# Patient Record
Sex: MALE | URBAN - METROPOLITAN AREA
[De-identification: names, ages, dates, MRNs, and addresses within clinical notes are randomized per-mention and may not be internally consistent; named-entity substitution may affect disease eponyms.]

---

## 2017-05-15 ENCOUNTER — HOSPITAL ENCOUNTER (OUTPATIENT)
Dept: GENERAL RADIOLOGY | Age: 57
Discharge: HOME | End: 2017-05-15

## 2017-05-15 DIAGNOSIS — M79.5 FOREIGN BODY (FB) IN SOFT TISSUE: ICD-10-CM

## 2024-07-08 ENCOUNTER — HOSPITAL ENCOUNTER (OUTPATIENT)
Dept: RADIOLOGY | Facility: CLINIC | Age: 64
Discharge: HOME | End: 2024-07-08
Payer: COMMERCIAL

## 2024-07-08 ENCOUNTER — OFFICE VISIT (OUTPATIENT)
Dept: ORTHOPEDIC SURGERY | Facility: CLINIC | Age: 64
End: 2024-07-08
Payer: COMMERCIAL

## 2024-07-08 DIAGNOSIS — L03.818 CELLULITIS OF OTHER SPECIFIED SITE: ICD-10-CM

## 2024-07-08 DIAGNOSIS — M79.672 LEFT FOOT PAIN: ICD-10-CM

## 2024-07-08 DIAGNOSIS — M19.079: ICD-10-CM

## 2024-07-08 PROCEDURE — 73630 X-RAY EXAM OF FOOT: CPT | Mod: LEFT SIDE | Performed by: RADIOLOGY

## 2024-07-08 PROCEDURE — 73610 X-RAY EXAM OF ANKLE: CPT | Mod: LT

## 2024-07-08 PROCEDURE — 99214 OFFICE O/P EST MOD 30 MIN: CPT | Performed by: FAMILY MEDICINE

## 2024-07-08 PROCEDURE — 73630 X-RAY EXAM OF FOOT: CPT | Mod: LT

## 2024-07-08 PROCEDURE — L4361 PNEUMA/VAC WALK BOOT PRE OTS: HCPCS | Performed by: FAMILY MEDICINE

## 2024-07-08 PROCEDURE — 73610 X-RAY EXAM OF ANKLE: CPT | Mod: LEFT SIDE | Performed by: RADIOLOGY

## 2024-07-08 RX ORDER — CEPHALEXIN 500 MG/1
500 CAPSULE ORAL 3 TIMES DAILY
Qty: 30 CAPSULE | Refills: 0 | Status: SHIPPED | OUTPATIENT
Start: 2024-07-08 | End: 2024-07-18

## 2024-07-08 RX ORDER — METHYLPREDNISOLONE 4 MG/1
TABLET ORAL
Qty: 21 TABLET | Refills: 0 | Status: SHIPPED | OUTPATIENT
Start: 2024-07-08 | End: 2024-07-15

## 2024-07-08 RX ORDER — DICLOFENAC SODIUM 10 MG/G
4 GEL TOPICAL 3 TIMES DAILY PRN
Qty: 1 G | Refills: 0 | Status: SHIPPED | OUTPATIENT
Start: 2024-07-08

## 2024-07-08 NOTE — PROGRESS NOTES
Acute Injury New Patient Visit    CC:   Chief Complaint   Patient presents with    Left Foot - New Patient Visit, Pain     Unknown injury  Xrays today       HPI: Carbera is a 63 y.o.male who presents today with new complaints of acute discomfort and swelling and irritation to the left foot.  He states the pain and discomfort worsened over 4 July.  He presents here today for further evaluation.  He denies any numbness tingling or burning has known history of flatfoot denies any history of neuropathy no diabetes.  In the past he has had hindfoot surgery on his right ankle.  He has had no surgeries to the left foot.  He states a little bit of warmth and swelling and swelling yesterday but no true fevers.  No history of gout or pseudogout.        Review of Systems   GENERAL: Negative for malaise, significant weight loss, fever  MUSCULOSKELETAL: See HPI  NEURO: Negative for numbness / tingling     Past Medical History  History reviewed. No pertinent past medical history.    Medication review  Medication Documentation Review Audit       Reviewed by Cole C Budinsky, MD (Physician) on 07/08/24 at 0904      Medication Order Taking? Sig Documenting Provider Last Dose Status            No Medications to Display                                   Allergies  Allergies   Allergen Reactions    Ciprofloxacin GI Upset and Other    Meloxicam GI Upset and Other    Tofacitinib GI Upset and Other    Erythromycin Other and Rash     Blood Blisters Fingers And Toes       Social History  Social History     Socioeconomic History    Marital status:      Spouse name: Not on file    Number of children: Not on file    Years of education: Not on file    Highest education level: Not on file   Occupational History    Not on file   Tobacco Use    Smoking status: Not on file    Smokeless tobacco: Not on file   Substance and Sexual Activity    Alcohol use: Not on file    Drug use: Not on file    Sexual activity: Not on file   Other Topics Concern     Not on file   Social History Narrative    Not on file     Social Determinants of Health     Financial Resource Strain: Not on file   Food Insecurity: Not on file   Transportation Needs: Not on file   Physical Activity: Not on file   Stress: Not on file   Social Connections: Not on file   Intimate Partner Violence: Not on file   Housing Stability: Not on file       Surgical History  History reviewed. No pertinent surgical history.    Physical Exam:  GENERAL:  Patient is awake, alert, and oriented to person place and time.  Patient appears well nourished and well kept.  Affect Calm, Not Acutely Distressed.  HEENT:  Normocephalic, Atraumatic, EOMI  CARDIOVASCULAR:  Hemodynamically stable.  RESPIRATORY:  Normal respirations with unlabored breathing.  NEURO: Gross sensation intact to the lower extremities bilaterally.  Extremity: Left foot exam demonstrates obvious soft tissue swelling and redness there is irritation and discomfort to the medial aspect of the midfoot.  There is a full collapse of the arch.  He has tenderness over the all of the medial soft tissues equivocal midfoot squeeze negative distal metatarsal squeeze pulses and sensation are intact soft tissue bruising posterior to the heel.  Negative medial and lateral malleoli pain no Achilles pain.  Limited plantarflexion dorsiflexion with obvious crepitus.  Unable to really talar tilt.      Diagnostics: X-rays today demonstrate severe amount of midfoot arthritis at the first tarsometatarsal joint and navicular joint.  No obvious presence of fracture moderate medial foot swelling.  Question of potential nondisplaced avulsion fracture off the dorsum of the navicular bone        Procedure: None  Procedures    Assessment:   Problem List Items Addressed This Visit    None  Visit Diagnoses       Left foot pain        Relevant Medications    methylPREDNISolone (Medrol Dospak) 4 mg tablets    cephalexin (Keflex) 500 mg capsule    diclofenac sodium (Voltaren) 1 %  gel    Other Relevant Orders    XR foot left 3+ views    XR ankle left 3+ views    Walking boot    Primary osteoarthritis of midfoot        Relevant Medications    methylPREDNISolone (Medrol Dospak) 4 mg tablets    cephalexin (Keflex) 500 mg capsule    diclofenac sodium (Voltaren) 1 % gel    Other Relevant Orders    Walking boot    Cellulitis of other specified site        Relevant Medications    methylPREDNISolone (Medrol Dospak) 4 mg tablets    cephalexin (Keflex) 500 mg capsule    diclofenac sodium (Voltaren) 1 % gel    Other Relevant Orders    Walking boot             Plan: At this time we will offer the patient a short versus tall boot for compression and support.  Will offer him an oral steroid for inflammation and swelling.  Will also offer him an oral antibiotic due to the redness and irritation of the foot.  Should the redness spread or get worse he was instructed to call or return otherwise we will see him back in 2 weeks for repeat skin check and repeat x-rays.  He should ice elevate and ambulate with the boot at all times until seen in follow-up.  Repeat x-rays 3 views of the left foot next visit.  Will also offer the patient Voltaren gel to assist with the swelling and discomfort.  Orders Placed This Encounter    Walking boot    XR foot left 3+ views    XR ankle left 3+ views    methylPREDNISolone (Medrol Dospak) 4 mg tablets    cephalexin (Keflex) 500 mg capsule    diclofenac sodium (Voltaren) 1 % gel      At the conclusion of the visit there were no further questions by the patient/family regarding their plan of care.  Patient was instructed to call or return with any issues, questions, or concerns regarding their injury and/or treatment plan described above.     07/08/24 at 9:07 AM - Cole C Budinsky, MD    Office: [phone]    This note was prepared using voice recognition software.  The details of this note are correct and have been reviewed, and corrected to the best of my ability.  Some  grammatical errors may persist related to the Dragon software.

## 2024-07-22 ENCOUNTER — HOSPITAL ENCOUNTER (OUTPATIENT)
Dept: RADIOLOGY | Facility: CLINIC | Age: 64
Discharge: HOME | End: 2024-07-22
Payer: COMMERCIAL

## 2024-07-22 ENCOUNTER — HOSPITAL ENCOUNTER (OUTPATIENT)
Dept: RADIOLOGY | Facility: EXTERNAL LOCATION | Age: 64
Discharge: HOME | End: 2024-07-22

## 2024-07-22 ENCOUNTER — OFFICE VISIT (OUTPATIENT)
Dept: ORTHOPEDIC SURGERY | Facility: CLINIC | Age: 64
End: 2024-07-22
Payer: COMMERCIAL

## 2024-07-22 DIAGNOSIS — M19.079: ICD-10-CM

## 2024-07-22 PROCEDURE — 2500000005 HC RX 250 GENERAL PHARMACY W/O HCPCS: Performed by: FAMILY MEDICINE

## 2024-07-22 PROCEDURE — 20604 DRAIN/INJ JOINT/BURSA W/US: CPT | Mod: LT | Performed by: FAMILY MEDICINE

## 2024-07-22 PROCEDURE — 99213 OFFICE O/P EST LOW 20 MIN: CPT | Performed by: FAMILY MEDICINE

## 2024-07-22 PROCEDURE — 73630 X-RAY EXAM OF FOOT: CPT | Mod: LEFT SIDE | Performed by: STUDENT IN AN ORGANIZED HEALTH CARE EDUCATION/TRAINING PROGRAM

## 2024-07-22 PROCEDURE — 2500000004 HC RX 250 GENERAL PHARMACY W/ HCPCS (ALT 636 FOR OP/ED): Performed by: FAMILY MEDICINE

## 2024-07-22 PROCEDURE — 99213 OFFICE O/P EST LOW 20 MIN: CPT | Mod: 25 | Performed by: FAMILY MEDICINE

## 2024-07-22 PROCEDURE — 73630 X-RAY EXAM OF FOOT: CPT | Mod: LT

## 2024-07-22 RX ORDER — BETAMETHASONE SODIUM PHOSPHATE AND BETAMETHASONE ACETATE 3; 3 MG/ML; MG/ML
6 INJECTION, SUSPENSION INTRA-ARTICULAR; INTRALESIONAL; INTRAMUSCULAR; SOFT TISSUE
Status: COMPLETED | OUTPATIENT
Start: 2024-07-22 | End: 2024-07-22

## 2024-07-22 RX ORDER — LIDOCAINE HYDROCHLORIDE 10 MG/ML
1 INJECTION INFILTRATION; PERINEURAL
Status: COMPLETED | OUTPATIENT
Start: 2024-07-22 | End: 2024-07-22

## 2024-07-22 NOTE — PROGRESS NOTES
Established Patient Follow-Up Visit    CC:   Chief Complaint   Patient presents with    Left Foot - Pain, Follow-up     Primary osteoarthritis of midfoot  Xrays today       HPI:  Cabrera is a 63 y.o. male returns here today for follow-up visit regarding: Left foot pain arthritis here for skin check.  He states the steroid was very helpful he does have a just a touch of pain and discomfort he would like to try an injection as we recently discussed previously.  He would also like to obtain a new orthotic prescription.  He currently has older ones from [employer].  He denies any numbness tingling or burning.  Has 2 out of 10 pain here today.          REVIEW OF SYSTEMS:  GENERAL: Negative for malaise, significant weight loss, fever  MUSCULOSKELETAL: See HPI  NEURO: Negative for numbness / tingling       PHYSICAL EXAM:  -Neuro: Gross sensation intact to the lower extremities bilaterally.  -Extremity: Left foot demonstrates no redness or erythema soft tissue tenderness palpation and bony tenderness over the midfoot medially.  Obvious chronic and stiff pes planus rigid deformity.  Ambulates without any pain.    IMAGING: No new images today  Point of Care Ultrasound  These images are not reportable by radiology and will not be interpreted   by  Radiologists.      PROCEDURE: None  S Inj/Asp: L intertarsal on 7/22/2024 11:34 AM  Indications: pain and joint swelling  Details: 25 G needle, ultrasound-guided medial approach  Medications: 1 mL lidocaine 10 mg/mL (1 %); 6 mg betamethasone acet,sod phos 6 mg/mL  Outcome: tolerated well, no immediate complications  Procedure, treatment alternatives, risks and benefits explained, specific risks discussed. Consent was given by the patient. Immediately prior to procedure a time out was called to verify the correct patient, procedure, equipment, support staff and site/side marked as required. Patient was prepped and draped in the usual sterile fashion.            ASSESSMENT:    Follow-up visit for:  Problem List Items Addressed This Visit    None  Visit Diagnoses       Primary osteoarthritis of midfoot        Relevant Orders    XR foot left 3+ views    Point of Care Ultrasound (Completed)             PLAN: Patient tolerated the injection well.  He had significant immediate symptomatic relief afterwards.  We will see him back as needed going forward.  He was also provided with a new [employer] orthotic prescription.  He should call or return with issues.  Should he desire further evaluation with a foot and ankle specialist he can call and my  can pass along the information to us and we will place a referral over the phone.  No need for him to return simply to obtain the foot and ankle referral.  Patient acknowledged understanding agreement of the plan if he would like to see me again I am happy to see him back however if he wants to see the specialist he will call and we will put in the referral over the phone.  Orders Placed This Encounter    S Inj/Asp    XR foot left 3+ views    Point of Care Ultrasound           At the conclusion of the visit there were no further questions by the patient/family regarding their plan of care.  Patient was instructed to call or return with any issues, questions, or concerns regarding their injury and/or treatment plan described above.     07/22/24 at 11:35 AM - Cole C Budinsky, MD    Office: [phone]    This note was prepared using voice recognition software.  The details of this note are correct and have been reviewed, and corrected to the best of my ability.  Some grammatical errors may persist related to the Dragon software.

## 2025-01-08 ENCOUNTER — HOSPITAL ENCOUNTER (OUTPATIENT)
Dept: RADIOLOGY | Facility: CLINIC | Age: 65
Discharge: HOME | End: 2025-01-08
Payer: COMMERCIAL

## 2025-01-08 ENCOUNTER — OFFICE VISIT (OUTPATIENT)
Dept: ORTHOPEDIC SURGERY | Facility: CLINIC | Age: 65
End: 2025-01-08
Payer: COMMERCIAL

## 2025-01-08 DIAGNOSIS — M25.561 RIGHT KNEE PAIN, UNSPECIFIED CHRONICITY: ICD-10-CM

## 2025-01-08 PROCEDURE — 99204 OFFICE O/P NEW MOD 45 MIN: CPT | Performed by: STUDENT IN AN ORGANIZED HEALTH CARE EDUCATION/TRAINING PROGRAM

## 2025-01-08 PROCEDURE — 73564 X-RAY EXAM KNEE 4 OR MORE: CPT | Mod: RT

## 2025-01-08 PROCEDURE — 73564 X-RAY EXAM KNEE 4 OR MORE: CPT | Mod: RIGHT SIDE | Performed by: RADIOLOGY

## 2025-01-08 PROCEDURE — 20610 DRAIN/INJ JOINT/BURSA W/O US: CPT | Performed by: STUDENT IN AN ORGANIZED HEALTH CARE EDUCATION/TRAINING PROGRAM

## 2025-01-08 RX ORDER — LIDOCAINE HYDROCHLORIDE 10 MG/ML
4 INJECTION, SOLUTION INFILTRATION; PERINEURAL
Status: COMPLETED | OUTPATIENT
Start: 2025-01-08 | End: 2025-01-08

## 2025-01-08 RX ORDER — TRIAMCINOLONE ACETONIDE 40 MG/ML
1 INJECTION, SUSPENSION INTRA-ARTICULAR; INTRAMUSCULAR
Status: COMPLETED | OUTPATIENT
Start: 2025-01-08 | End: 2025-01-08

## 2025-01-08 RX ORDER — HYDROCHLOROTHIAZIDE 25 MG/1
25 TABLET ORAL
COMMUNITY
Start: 2024-08-30

## 2025-01-08 RX ORDER — METOPROLOL SUCCINATE 100 MG/1
100 TABLET, EXTENDED RELEASE ORAL
COMMUNITY
Start: 2024-08-30

## 2025-01-08 RX ADMIN — TRIAMCINOLONE ACETONIDE 1 ML: 40 INJECTION, SUSPENSION INTRA-ARTICULAR; INTRAMUSCULAR at 10:36

## 2025-01-08 RX ADMIN — LIDOCAINE HYDROCHLORIDE 4 ML: 10 INJECTION, SOLUTION INFILTRATION; PERINEURAL at 10:36

## 2025-01-08 ASSESSMENT — PAIN DESCRIPTION - DESCRIPTORS: DESCRIPTORS: PRESSURE

## 2025-01-08 ASSESSMENT — PAIN - FUNCTIONAL ASSESSMENT: PAIN_FUNCTIONAL_ASSESSMENT: 0-10

## 2025-01-08 ASSESSMENT — PAIN SCALES - GENERAL: PAINLEVEL_OUTOF10: 10 - WORST POSSIBLE PAIN

## 2025-01-08 NOTE — PROGRESS NOTES
Patient ID: Cabrera Meza is a 64 y.o. male.    L Inj/Asp: R knee on 1/8/2025 10:36 AM  Indications: pain and joint swelling  Details: 22 G needle, anterolateral approach  Medications: 1 mL triamcinolone acetonide 40 mg/mL; 4 mL lidocaine 10 mg/mL (1 %)  Outcome: tolerated well, no immediate complications    Discussion:  I discussed the conservative treatment options for knee osteoarthritis including but not limited to physical therapy, oral NSAIDS, activity and lifestyle modification, and corticosteroid injections. Pt has elected to undergo a cortisone injection today. I have explained the risk and benefits of an injection including the possibility of joint infection, bleeding, damage to cartilage, allergic reaction. Patient verbalized understanding and gave verbal consent wishes to proceed with a intra-articular cortisone injection for their knee.    Procedure:  After discussing the risk and benefits of the procedure, we proceeded with an intra-articular right knee injection.    With the patient's informed verbal consent, the right knee was prepped in standard sterile fashion with Chlorhexidine. The skin was then anesthetized with ethyl chloride spray and cleaned again with Chlorhexidine. The knee was then apirated/injected with a prefilled 20-gauge syringe of 40 mg Kenalog + 4 ml Lidocaine using the lateral approach without complications.  The patient tolerated this well and felt immediate initial relief of symptoms. A bandaid was applied and the patient ambulated out of the clinic on ther own accord without difficulty. Patient was instructed to avoid physical activity for 24-48 hours to prevent the knees from swelling and may ice the knees as tolerated. Patient should contact the office if any signs of of infection appear: redness, fever, chills, drainage, swelling or warmth to the knees.  Pt understands that the injections can be repeated no sooner than 3 months.    Procedure, treatment alternatives, risks and  benefits explained, specific risks discussed. Consent was given by the patient. Immediately prior to procedure a time out was called to verify the correct patient, procedure, equipment, support staff and site/side marked as required. Patient was prepped and draped in the usual sterile fashion.

## 2025-01-08 NOTE — PROGRESS NOTES
Department of Orthopaedic Surgery  Division of Adult Reconstruction    Chief Complaint: Right knee pain    HPI:  Cabrera Meza is a pleasant 64 y.o. year-old male who is seen today for evaluation of right knee pain.  Patient  has complained of right knee pain for several months.  It is aggravated by prolonged activity and walking on uneven surfaces.  He has intermittent episodes of acute knee swelling which typically takes days to resolve.  He had a steroid injection several months ago that afforded good relief for 6 weeks. He would like to try another injection today.     Review of Symptoms:  The patient denies any fever, chills, chest pain, shortness of breath or difficulty breathing.  Patient denies any numbness, tingling, or radicular symptoms.      Adult patient history sheet was filled out by the patient today in clinic and will be scanned into the EMR.  I personally reviewed this form which will be scanned into the EMR.  This includes Past Medical History, Past Surgical History, Medications, Allergies, Social History, Family History and 12 point review of systems.    Past Medical History:  No past medical history on file.    Past Surgical History:  No past surgical history on file.    Allergies:  Allergies   Allergen Reactions    Ciprofloxacin GI Upset and Other    Meloxicam GI Upset and Other    Tofacitinib GI Upset and Other    Erythromycin Other and Rash     Blood Blisters Fingers And Toes     Medications:  Current Outpatient Medications on File Prior to Visit   Medication Sig Dispense Refill    hydroCHLOROthiazide (HYDRODiuril) 25 mg tablet Take 1 tablet (25 mg) by mouth once daily.      metoprolol succinate XL (Toprol-XL) 100 mg 24 hr tablet Take 1 tablet (100 mg) by mouth once daily.      diclofenac sodium (Voltaren) 1 % gel Apply 4.5 inches (4 g) topically 3 times a day as needed (apply as directed). (Patient not taking: Reported on 1/8/2025) 1 g 0     No current facility-administered medications on  file prior to visit.     Social History:  Social History     Occupational History    Not on file   Tobacco Use    Smoking status: Not on file    Smokeless tobacco: Not on file   Substance and Sexual Activity    Alcohol use: Not on file    Drug use: Not on file    Sexual activity: Not on file     Family History:  he No family history on file.    Exam:  General: Well-appearing male in no acute distress.  Awake, alert and oriented.  Pleasant and cooperative.  Respiratory: Non-labored breathing  Mood: Euthymic   Gait: Antalgic  Assistive Device: None     Affected Right Knee  Limb Alignment: Mild varus  ROM: 5-90  Stable to varus and valgus stress at full extension and 30 degrees of flexion  Skin: Intact, no abrasions or draining sinuses  Effusion: None  Quad Strength: 5/5  Hamstring Strength: 5/5  Patella Crepitus: None  Patella Grind: Negative  Tenderness: Medial joint line and anteriorly  Sensation: Intact to light touch distally  Motor function: Able to fire TA, EHL, G/S  Pulses: Palpable DP pulse    Imaging interpretation:   AP/ lateral/ mid-flexion/sunrise views: Independent review of right knee x-rays was performed. The findings were reviewed with the patient. There are mild degenerative changes of the right knee with varus limb alignment. There is joint space narrowing, subchondral sclerosis, and osteophyte formation.    Assessment and Plan:  64-year old male with right knee pain secondary to osteoarthritis.  We discussed the paradigm of treatment options, and he opted to proceed with an injection today, as he has done well with his previous injection.  He will return to clinic as needed to continue non-op care versus discuss knee replacement.    Senthil Bernstein MD  Department of Orthopaedic Surgery  Division of Adult Reconstruction  , [facility]

## 2025-01-27 ENCOUNTER — HOSPITAL ENCOUNTER (OUTPATIENT)
Dept: RADIOLOGY | Facility: CLINIC | Age: 65
Discharge: HOME | End: 2025-01-27
Payer: COMMERCIAL

## 2025-01-27 ENCOUNTER — OFFICE VISIT (OUTPATIENT)
Dept: ORTHOPEDIC SURGERY | Facility: CLINIC | Age: 65
End: 2025-01-27
Payer: COMMERCIAL

## 2025-01-27 ENCOUNTER — APPOINTMENT (OUTPATIENT)
Dept: ORTHOPEDIC SURGERY | Facility: CLINIC | Age: 65
End: 2025-01-27
Payer: COMMERCIAL

## 2025-01-27 DIAGNOSIS — M79.671 RIGHT FOOT PAIN: ICD-10-CM

## 2025-01-27 DIAGNOSIS — M25.40 SWELLING OF JOINT OF MULTIPLE SITES: ICD-10-CM

## 2025-01-27 PROCEDURE — 99213 OFFICE O/P EST LOW 20 MIN: CPT | Performed by: INTERNAL MEDICINE

## 2025-01-27 PROCEDURE — 99214 OFFICE O/P EST MOD 30 MIN: CPT | Performed by: INTERNAL MEDICINE

## 2025-01-27 PROCEDURE — 73630 X-RAY EXAM OF FOOT: CPT | Mod: RIGHT SIDE | Performed by: INTERNAL MEDICINE

## 2025-01-27 PROCEDURE — 73630 X-RAY EXAM OF FOOT: CPT | Mod: RT

## 2025-01-27 RX ORDER — PREDNISONE 50 MG/1
50 TABLET ORAL DAILY
Qty: 5 TABLET | Refills: 0 | Status: SHIPPED | OUTPATIENT
Start: 2025-01-27 | End: 2025-02-01

## 2025-01-27 NOTE — PROGRESS NOTES
Acute Injury New Patient Visit    CC:   Chief Complaint   Patient presents with    Right Foot - Pain     Xrays today        HPI: Cabrera is a 64 y.o. male presents today for evaluation for acute right foot pain and swelling which started three days ago. No trauma or fall. He denies any history of gout. He is here for initial evaluation and x-rays. He is not diabetic.  He states couple weeks ago he had his knee aspirated.  And is now also noting swelling of the right wrist.  Complaining of multiple joint pain with effusion.        Review of Systems   GENERAL: Negative for malaise, significant weight loss, fever  MUSCULOSKELETAL: See HPI  NEURO:  Negative for numbness / tingling     Past Medical History  No past medical history on file.    Medication review  Medication Documentation Review Audit       Reviewed by Dahlia Reed MA (Medical Assistant) on 01/08/25 at 0947      Medication Order Taking? Sig Documenting Provider Last Dose Status   diclofenac sodium (Voltaren) 1 % gel [id number]  Apply 4.5 inches (4 g) topically 3 times a day as needed (apply as directed).   Patient not taking: Reported on 1/8/2025    Cole C Budinsky, MD  Active   hydroCHLOROthiazide (HYDRODiuril) 25 mg tablet [id number] Yes Take 1 tablet (25 mg) by mouth once daily. Historical Provider, MD  Active   metoprolol succinate XL (Toprol-XL) 100 mg 24 hr tablet [id number] Yes Take 1 tablet (100 mg) by mouth once daily. Historical Provider, MD  Active                    Allergies  Allergies   Allergen Reactions    Ciprofloxacin GI Upset and Other    Meloxicam GI Upset and Other    Tofacitinib GI Upset and Other    Erythromycin Other and Rash     Blood Blisters Fingers And Toes       Social History  Social History     Socioeconomic History    Marital status:      Spouse name: Not on file    Number of children: Not on file    Years of education: Not on file    Highest education level: Not on file   Occupational History    Not on file   Tobacco Use     Smoking status: Not on file    Smokeless tobacco: Not on file   Substance and Sexual Activity    Alcohol use: Not on file    Drug use: Not on file    Sexual activity: Not on file   Other Topics Concern    Not on file   Social History Narrative    Not on file     Social Drivers of Health     Financial Resource Strain: Not on file   Food Insecurity: Not on file   Transportation Needs: Not on file   Physical Activity: Not on file   Stress: Not on file   Social Connections: Not on file   Intimate Partner Violence: Not on file   Housing Stability: Not on file       Surgical History  No past surgical history on file.    Physical Exam:  GENERAL:  Patient is awake, alert, and oriented to person place and time.  Patient appears well nourished and well kept.  Affect Calm, Not Acutely Distressed.  HEENT:  Normocephalic, Atraumatic, EOMI  CARDIOVASCULAR:  Hemodynamically stable.  RESPIRATORY:  Normal respirations with unlabored breathing.  Extremity: Right foot examination shows skin is intact.  There is no erythema or warmth.  Swelling of the right midfoot.  No obvious deformity.  There is no clinical signs of infection.  There is no pain over the base of the fifth metatarsal bone.  There is pain in the midfoot.  No pain over the metatarsal bones.  No pain of the cuboid bone.  There is no pain in the calcaneus.  There is no pain over the plantar aponeurosis.  There is no pain over the proximal phalanx of the right great toe.  No pain over distal phalanx of the right great toe.  Neurovascularly intact.      Diagnostics: X-rays reviewed  XR knee right 4+ views  Narrative: Interpreted By:  Ambar Quispe,   STUDY:  XR KNEE RIGHT 4+ VIEWS 1/8/2025 9:40 am      INDICATION:  Signs/Symptoms:RIGHT KNEE PAIN      COMPARISON:  06/21/2023      ACCESSION NUMBER(S):  [id number]      ORDERING CLINICIAN:  DIEGO TRAMMELL      TECHNIQUE:  Four views of right knee including weight-bearing images      FINDINGS:  The medial compartment is mildly  narrowed. A large joint effusion is  present. There is some osteophytosis of the patellar articular  surface. No fracture or dislocation of the knee is seen.      Impression: Mild medial compartmental narrowing with small osteophytes arising  from the patellar articular surface indicating mild DJD. However, a  large joint effusion is present.      Signed by: Ambar Quispe 1/11/2025 6:02 PM  Dictation workstation:   [id number]      Procedure: None    Assessment:   Right midfoot arthritis  Multiple joint pain and swelling    Plan: Cabrera presents today for evaluation for acute right foot pain and swelling which started three days ago secondary to aggravation of his right midfoot arthritis. No trauma or fall. X-rays showed no obvious fractures advanced arthritic changes along the midfoot. We recommended short course dose of oral prednisone 50 mg for 5 days for inflammation and we will send a referral to rheumatology for for multiple joint pain and swelling. He will follow-up in 10 days for reevaluation.  We discussed any fevers, chills or increased erythema, he should call the office or go directly to the emergency room.  May consider custom orthotics for recurrent pain or referral to foot and ankle.    Orders Placed This Encounter    XR foot right 3+ views      At the conclusion of the visit there were no further questions by the patient/family regarding their plan of care.  Patient was instructed to call or return with any issues, questions, or concerns regarding their injury and/or treatment plan described above.     01/27/25 at 11:13 AM - Jacque Emmanuel MD  Scribe Attestation  By signing my name below, I Sam Fortino Scrmanju   attest that this documentation has been prepared under the direction and in the presence of Jacque Emmanuel MD.    Office: [phone]    This note was prepared using voice recognition software.  The details of this note are correct and have been reviewed, and corrected to the best of  my ability.  Some grammatical errors may persist related to the Dragon software.

## 2025-02-06 ENCOUNTER — APPOINTMENT (OUTPATIENT)
Dept: ORTHOPEDIC SURGERY | Facility: CLINIC | Age: 65
End: 2025-02-06
Payer: COMMERCIAL

## 2025-02-10 ENCOUNTER — TELEPHONE (OUTPATIENT)
Dept: ORTHOPEDIC SURGERY | Facility: CLINIC | Age: 65
End: 2025-02-10
Payer: COMMERCIAL

## 2025-02-11 DIAGNOSIS — M25.40 SWELLING OF JOINT OF MULTIPLE SITES: Primary | ICD-10-CM

## 2025-02-11 RX ORDER — PREDNISONE 50 MG/1
50 TABLET ORAL DAILY
Qty: 5 TABLET | Refills: 0 | Status: SHIPPED | OUTPATIENT
Start: 2025-02-11 | End: 2025-02-16

## 2025-02-11 NOTE — PROGRESS NOTES
Called in another prescription of oral steroids, as patient is complaining of increased swelling of the right wrist.  Patient is scheduled to see his rheumatologist in about 3 weeks.  Advised patient to discontinue his hydrochlorothiazide, which is a risk factor for precipitating gout, he will confirm with his primary care physician to discontinue until further notice.

## 2025-03-11 ENCOUNTER — APPOINTMENT (OUTPATIENT)
Facility: CLINIC | Age: 65
End: 2025-03-11
Payer: COMMERCIAL

## 2025-04-16 ENCOUNTER — APPOINTMENT (OUTPATIENT)
Facility: CLINIC | Age: 65
End: 2025-04-16
Payer: COMMERCIAL